# Patient Record
Sex: MALE | Race: WHITE | NOT HISPANIC OR LATINO | Employment: FULL TIME | URBAN - NONMETROPOLITAN AREA
[De-identification: names, ages, dates, MRNs, and addresses within clinical notes are randomized per-mention and may not be internally consistent; named-entity substitution may affect disease eponyms.]

---

## 2022-12-25 ENCOUNTER — APPOINTMENT (EMERGENCY)
Dept: CT IMAGING | Facility: HOSPITAL | Age: 42
End: 2022-12-25

## 2022-12-25 ENCOUNTER — APPOINTMENT (EMERGENCY)
Dept: NON INVASIVE DIAGNOSTICS | Facility: HOSPITAL | Age: 42
End: 2022-12-25

## 2022-12-25 ENCOUNTER — HOSPITAL ENCOUNTER (EMERGENCY)
Facility: HOSPITAL | Age: 42
Discharge: HOME | End: 2022-12-25
Attending: EMERGENCY MEDICINE

## 2022-12-25 VITALS
RESPIRATION RATE: 16 BRPM | SYSTOLIC BLOOD PRESSURE: 157 MMHG | OXYGEN SATURATION: 97 % | TEMPERATURE: 97.9 F | WEIGHT: 176.37 LBS | HEIGHT: 69 IN | BODY MASS INDEX: 26.12 KG/M2 | HEART RATE: 71 BPM | DIASTOLIC BLOOD PRESSURE: 83 MMHG

## 2022-12-25 DIAGNOSIS — R07.9 CHEST PAIN: ICD-10-CM

## 2022-12-25 DIAGNOSIS — M79.602 LEFT ARM PAIN: Primary | ICD-10-CM

## 2022-12-25 LAB
ALBUMIN SERPL BCP-MCNC: 3.4 G/DL (ref 3.5–5)
ALP SERPL-CCNC: 68 U/L (ref 46–116)
ALT SERPL W P-5'-P-CCNC: 61 U/L (ref 12–78)
ANION GAP SERPL CALCULATED.3IONS-SCNC: 8 MMOL/L (ref 4–13)
APTT PPP: 30 SECONDS (ref 23–37)
AST SERPL W P-5'-P-CCNC: 31 U/L (ref 5–45)
BASOPHILS # BLD AUTO: 0.09 THOUSANDS/ÂΜL (ref 0–0.1)
BASOPHILS NFR BLD AUTO: 1 % (ref 0–1)
BILIRUB SERPL-MCNC: 0.46 MG/DL (ref 0.2–1)
BUN SERPL-MCNC: 11 MG/DL (ref 5–25)
CALCIUM ALBUM COR SERPL-MCNC: 8.4 MG/DL (ref 8.3–10.1)
CALCIUM SERPL-MCNC: 7.9 MG/DL (ref 8.3–10.1)
CARDIAC TROPONIN I PNL SERPL HS: 6 NG/L
CHLORIDE SERPL-SCNC: 101 MMOL/L (ref 96–108)
CO2 SERPL-SCNC: 28 MMOL/L (ref 21–32)
CREAT SERPL-MCNC: 1.09 MG/DL (ref 0.6–1.3)
EOSINOPHIL # BLD AUTO: 0.28 THOUSAND/ÂΜL (ref 0–0.61)
EOSINOPHIL NFR BLD AUTO: 4 % (ref 0–6)
ERYTHROCYTE [DISTWIDTH] IN BLOOD BY AUTOMATED COUNT: 13.5 % (ref 11.6–15.1)
GFR SERPL CREATININE-BSD FRML MDRD: 83 ML/MIN/1.73SQ M
GLUCOSE SERPL-MCNC: 111 MG/DL (ref 65–140)
HCT VFR BLD AUTO: 43.9 % (ref 36.5–49.3)
HGB BLD-MCNC: 15.2 G/DL (ref 12–17)
IMM GRANULOCYTES # BLD AUTO: 0.04 THOUSAND/UL (ref 0–0.2)
IMM GRANULOCYTES NFR BLD AUTO: 1 % (ref 0–2)
INR PPP: 1.08 (ref 0.84–1.19)
LYMPHOCYTES # BLD AUTO: 2.4 THOUSANDS/ÂΜL (ref 0.6–4.47)
LYMPHOCYTES NFR BLD AUTO: 31 % (ref 14–44)
MAGNESIUM SERPL-MCNC: 2 MG/DL (ref 1.6–2.6)
MCH RBC QN AUTO: 31.9 PG (ref 26.8–34.3)
MCHC RBC AUTO-ENTMCNC: 34.6 G/DL (ref 31.4–37.4)
MCV RBC AUTO: 92 FL (ref 82–98)
MONOCYTES # BLD AUTO: 0.57 THOUSAND/ÂΜL (ref 0.17–1.22)
MONOCYTES NFR BLD AUTO: 7 % (ref 4–12)
NEUTROPHILS # BLD AUTO: 4.43 THOUSANDS/ÂΜL (ref 1.85–7.62)
NEUTS SEG NFR BLD AUTO: 56 % (ref 43–75)
NRBC BLD AUTO-RTO: 0 /100 WBCS
PLATELET # BLD AUTO: 274 THOUSANDS/UL (ref 149–390)
PMV BLD AUTO: 9.7 FL (ref 8.9–12.7)
POTASSIUM SERPL-SCNC: 4 MMOL/L (ref 3.5–5.3)
PROT SERPL-MCNC: 6.7 G/DL (ref 6.4–8.4)
PROTHROMBIN TIME: 14.1 SECONDS (ref 11.6–14.5)
RBC # BLD AUTO: 4.76 MILLION/UL (ref 3.88–5.62)
SODIUM SERPL-SCNC: 137 MMOL/L (ref 135–147)
WBC # BLD AUTO: 7.81 THOUSAND/UL (ref 4.31–10.16)

## 2022-12-25 RX ORDER — TRAMADOL HYDROCHLORIDE 50 MG/1
50 TABLET ORAL
COMMUNITY
Start: 2022-12-23

## 2022-12-25 RX ORDER — LEVOTHYROXINE SODIUM 0.05 MG/1
50 TABLET ORAL DAILY
COMMUNITY
Start: 2022-08-31

## 2022-12-25 RX ORDER — OMEPRAZOLE 20 MG/1
20 CAPSULE, DELAYED RELEASE ORAL
COMMUNITY
Start: 2022-09-08

## 2022-12-25 RX ORDER — FAMOTIDINE 20 MG/1
20 TABLET, FILM COATED ORAL DAILY
COMMUNITY
Start: 2022-08-30

## 2022-12-25 RX ORDER — SERTRALINE HYDROCHLORIDE 100 MG/1
100 TABLET, FILM COATED ORAL DAILY
COMMUNITY
Start: 2022-08-31

## 2022-12-25 RX ORDER — RISPERIDONE 0.5 MG/1
0.5 TABLET ORAL
COMMUNITY
Start: 2022-09-08

## 2022-12-25 RX ORDER — PANTOPRAZOLE SODIUM 20 MG/1
20 TABLET, DELAYED RELEASE ORAL DAILY
COMMUNITY
Start: 2022-08-31

## 2022-12-25 RX ORDER — ALBUTEROL SULFATE 90 UG/1
2 AEROSOL, METERED RESPIRATORY (INHALATION) EVERY 4 HOURS PRN
COMMUNITY
Start: 2022-08-29

## 2022-12-25 RX ADMIN — IOHEXOL 100 ML: 350 INJECTION, SOLUTION INTRAVENOUS at 14:42

## 2022-12-25 NOTE — ED NOTES
Patient transported to [facility]aiman Motcecilio, [address] [place] [address]  12/25/22 5535

## 2022-12-25 NOTE — ED PROVIDER NOTES
History  Chief Complaint   Patient presents with   • Arm Pain     Pt c/o left upper arm pain and warmth starting this morning-also mentioned while driving to ED experienced tightness in left shoulderblade radiating to chest at 1230  Pt recently dx blood clots in left leg 6 days ago starting eliquis 3days ago  The patient is a 40year old male who presnts to the Ed with the c/o chest pain SOB and left arm pain  The patient states that he was dx with a DVT in his right leg 5 days ago at another hospital  He was unable to get the rx of eliquis until 2 days ago  Last night into today he has had worsening left upper arm pain, now feels warm and swollen, no falls or injuries  He states that around 1 hour ago he started having left posterior shoulder pain with radiation to the left chest, he had SOB during that time  It lasted 15 minutes and subsided without intervention  Now only complaining of the left arm pain  Denies any recent illnesses, surgeries or travel  Has upcoming appointment with specialist Tuesday  He is concerned about an upper arm blood clot or a PE  Denies smoking  Chest Pain  Pain location:  L chest  Pain quality: sharp    Pain radiates to:  L arm  Pain radiates to the back: yes    Pain severity:  Moderate  Timing:  Constant  Progression:  Unchanged  Chronicity:  New  Context: at rest    Relieved by:  Nothing  Worsened by:  Nothing tried  Ineffective treatments:  None tried  Associated symptoms: shortness of breath    Associated symptoms: no abdominal pain, no anorexia, no back pain, no diaphoresis, no dysphagia, no fatigue, no heartburn, no near-syncope, no orthopnea, no palpitations and not vomiting    Shortness of breath:     Severity:  Unable to specify    Timing:  Constant    Progression:  Unchanged  Risk factors: prior DVT/PE        Prior to Admission Medications   Prescriptions Last Dose Informant Patient Reported? Taking?    albuterol (PROVENTIL HFA,VENTOLIN HFA) 90 mcg/act inhaler   Yes Yes   Sig: Inhale 2 puffs every 4 (four) hours as needed   apixaban (ELIQUIS) 5 mg   Yes Yes   Sig: Take 2 tablets twice a day for 7 days, then reduce dose to 1 tablet twice a day   famotidine (PEPCID) 20 mg tablet   Yes Yes   Sig: Take 20 mg by mouth daily   levothyroxine 50 mcg tablet   Yes Yes   Sig: Take 50 mcg by mouth daily   omeprazole (PriLOSEC) 20 mg delayed release capsule   Yes Yes   Sig: Take 20 mg by mouth   pantoprazole (PROTONIX) 20 mg tablet   Yes Yes   Sig: Take 20 mg by mouth daily   risperiDONE (RisperDAL) 0 5 mg tablet   Yes Yes   Sig: Take 0 5 mg by mouth   sertraline (ZOLOFT) 100 mg tablet   Yes Yes   Sig: Take 100 mg by mouth daily   traMADol (ULTRAM) 50 mg tablet   Yes Yes   Sig: Take 50 mg by mouth      Facility-Administered Medications: None       Past Medical History:   Diagnosis Date   • Asthma    • Celiac disease        History reviewed  No pertinent surgical history  History reviewed  No pertinent family history  I have reviewed and agree with the history as documented  E-Cigarette/Vaping   • E-Cigarette Use Never User      E-Cigarette/Vaping Substances     Social History     Tobacco Use   • Smoking status: Never   • Smokeless tobacco: Never   Vaping Use   • Vaping Use: Never used   Substance Use Topics   • Alcohol use: Never   • Drug use: Never       Review of Systems   Constitutional: Negative for chills, diaphoresis and fatigue  HENT: Negative for trouble swallowing  Eyes: Negative for pain and visual disturbance  Respiratory: Positive for shortness of breath  Negative for stridor  Cardiovascular: Positive for chest pain  Negative for palpitations, orthopnea and near-syncope  Gastrointestinal: Negative for abdominal pain, anorexia, heartburn and vomiting  Genitourinary: Negative for dysuria and hematuria  Musculoskeletal: Negative for arthralgias and back pain  Skin: Negative for color change  Neurological: Negative for seizures and speech difficulty     All other systems reviewed and are negative  Physical Exam  Physical Exam  Vitals and nursing note reviewed  Constitutional:       General: He is not in acute distress  Appearance: He is well-developed  HENT:      Head: Normocephalic and atraumatic  Mouth/Throat:      Mouth: Mucous membranes are moist    Eyes:      Extraocular Movements: Extraocular movements intact  Conjunctiva/sclera: Conjunctivae normal       Pupils: Pupils are equal, round, and reactive to light  Cardiovascular:      Rate and Rhythm: Normal rate and regular rhythm  Heart sounds: No murmur heard  Pulmonary:      Effort: Pulmonary effort is normal  No respiratory distress  Breath sounds: Normal breath sounds  Abdominal:      Palpations: Abdomen is soft  Tenderness: There is no abdominal tenderness  There is no right CVA tenderness or left CVA tenderness  Musculoskeletal:         General: No swelling  Cervical back: Normal range of motion and neck supple  Right lower leg: No edema  Left lower leg: No edema  Skin:     General: Skin is warm and dry  Capillary Refill: Capillary refill takes less than 2 seconds  Neurological:      General: No focal deficit present  Mental Status: He is alert and oriented to person, place, and time     Psychiatric:         Mood and Affect: Mood normal          Vital Signs  ED Triage Vitals [12/25/22 1311]   Temperature Pulse Respirations Blood Pressure SpO2   97 9 °F (36 6 °C) 94 18 130/94 98 %      Temp Source Heart Rate Source Patient Position - Orthostatic VS BP Location FiO2 (%)   Temporal Monitor Sitting Right arm --      Pain Score       7           Vitals:    12/25/22 1311 12/25/22 1315 12/25/22 1430   BP: 130/94 132/90 157/83   Pulse: 94 94 71   Patient Position - Orthostatic VS: Sitting           Visual Acuity      ED Medications  Medications   iohexol (OMNIPAQUE) 350 MG/ML injection (SINGLE-DOSE) 100 mL (100 mL Intravenous Given 12/25/22 1442)       Diagnostic Studies  Results Reviewed     Procedure Component Value Units Date/Time    Comprehensive metabolic panel [[id number]]  (Abnormal) Collected: 12/25/22 1321    Lab Status: Final result Specimen: Blood from Arm, Right Updated: 12/25/22 1421     Sodium 137 mmol/L      Potassium 4 0 mmol/L      Chloride 101 mmol/L      CO2 28 mmol/L      ANION GAP 8 mmol/L      BUN 11 mg/dL      Creatinine 1 09 mg/dL      Glucose 111 mg/dL      Calcium 7 9 mg/dL      Corrected Calcium 8 4 mg/dL      AST 31 U/L      ALT 61 U/L      Alkaline Phosphatase 68 U/L      Total Protein 6 7 g/dL      Albumin 3 4 g/dL      Total Bilirubin 0 46 mg/dL      eGFR 83 ml/min/1 73sq m     Narrative:      [place] guidelines for Chronic Kidney Disease (CKD):   •  Stage 1 with normal or high GFR (GFR > 90 mL/min/1 73 square meters)  •  Stage 2 Mild CKD (GFR = 60-89 mL/min/1 73 square meters)  •  Stage 3A Moderate CKD (GFR = 45-59 mL/min/1 73 square meters)  •  Stage 3B Moderate CKD (GFR = 30-44 mL/min/1 73 square meters)  •  Stage 4 Severe CKD (GFR = 15-29 mL/min/1 73 square meters)  •  Stage 5 End Stage CKD (GFR <15 mL/min/1 73 square meters)  Note: GFR calculation is accurate only with a steady state creatinine    Magnesium [[id number]]  (Normal) Collected: 12/25/22 1321    Lab Status: Final result Specimen: Blood from Arm, Right Updated: 12/25/22 1421     Magnesium 2 0 mg/dL     HS Troponin 0hr (reflex protocol) [[id number]]  (Normal) Collected: 12/25/22 1321    Lab Status: Final result Specimen: Blood from Arm, Right Updated: 12/25/22 1355     hs TnI 0hr 6 ng/L     Protime-INR [[id number]]  (Normal) Collected: 12/25/22 1321    Lab Status: Final result Specimen: Blood from Arm, Right Updated: 12/25/22 1343     Protime 14 1 seconds      INR 1 08    APTT [[id number]]  (Normal) Collected: 12/25/22 1321    Lab Status: Final result Specimen: Blood from Arm, Right Updated: 12/25/22 1343     PTT 30 seconds     CBC and differential [[id number]] Collected: 12/25/22 1321    Lab Status: Final result Specimen: Blood from Arm, Right Updated: 12/25/22 1330     WBC 7 81 Thousand/uL      RBC 4 76 Million/uL      Hemoglobin 15 2 g/dL      Hematocrit 43 9 %      MCV 92 fL      MCH 31 9 pg      MCHC 34 6 g/dL      RDW 13 5 %      MPV 9 7 fL      Platelets 276 Thousands/uL      nRBC 0 /100 WBCs      Neutrophils Relative 56 %      Immat GRANS % 1 %      Lymphocytes Relative 31 %      Monocytes Relative 7 %      Eosinophils Relative 4 %      Basophils Relative 1 %      Neutrophils Absolute 4 43 Thousands/µL      Immature Grans Absolute 0 04 Thousand/uL      Lymphocytes Absolute 2 40 Thousands/µL      Monocytes Absolute 0 57 Thousand/µL      Eosinophils Absolute 0 28 Thousand/µL      Basophils Absolute 0 09 Thousands/µL                  CTA ED chest PE study   Final Result by Danielle Hicks MD (12/25 9286)      No PE                    Workstation performed: [id number]         VAS upper limb venous duplex scan, unilateral/limited    (Results Pending)              Procedures  ECG 12 Lead Documentation Only    Date/Time: 12/25/2022 4:34 PM  Performed by: Matt Davis PA-C  Authorized by: Matt Davis PA-C     Indications / Diagnosis:  Cp  ECG reviewed by me, the ED Provider: yes    Patient location:  ED  Previous ECG:     Previous ECG:  Unavailable  Interpretation:     Interpretation: non-specific    Rate:     ECG rate:  78    ECG rate assessment: normal    Rhythm:     Rhythm: sinus rhythm               ED Course             HEART Risk Score    Flowsheet Row Most Recent Value   Heart Score Risk Calculator    History 0 Filed at: 12/25/2022 1537   ECG 0 Filed at: 12/25/2022 1537   Age 0 Filed at: 12/25/2022 1537   Risk Factors 1 Filed at: 12/25/2022 1537   Troponin 0 Filed at: 12/25/2022 1537   HEART Score 1 Filed at: 12/25/2022 1537                                      MDM  Number of Diagnoses or Management Options  Chest pain  Left arm pain  Diagnosis management comments: Work unremarkable cardiac enzymes negative, EKG without any acute ischemic findings  Patient was on room air, no respiratory distress  CT PE study was obtained, negative for PE or pneumonia  Ultrasound of the left upper extremity was negative for DVT  Patient was educated to continue Eliquis and follow-up  Patient expressed understanding was agreement treatment plan  Atypical chest pain upon today's diferential diagnosis includes but not limited to most likely MSK, anxiety, spasm, strain  Educated to f/u with pcp  Amount and/or Complexity of Data Reviewed  Clinical lab tests: ordered and reviewed  Tests in the radiology section of CPT®: ordered and reviewed  Decide to obtain previous medical records or to obtain history from someone other than the patient: yes  Review and summarize past medical records: yes  Independent visualization of images, tracings, or specimens: yes    Risk of Complications, Morbidity, and/or Mortality  Presenting problems: moderate  Diagnostic procedures: moderate  Management options: moderate        Disposition  Final diagnoses:   Left arm pain   Chest pain     Time reflects when diagnosis was documented in both MDM as applicable and the Disposition within this note     Time User Action Codes Description Comment    12/25/2022  3:37 PM Lore Quintana Add [U08 889] Left arm pain     12/25/2022  3:37 PM Lore Quintana Add [R07 9] Chest pain       ED Disposition     ED Disposition   Discharge    Condition   Stable    Date/Time   Sun Dec 25, 2022  3:37 PM    Comment   Geofm Raspberry discharge to home/self care                 Follow-up Information    None         Discharge Medication List as of 12/25/2022  3:37 PM      CONTINUE these medications which have NOT CHANGED    Details   albuterol (PROVENTIL HFA,VENTOLIN HFA) 90 mcg/act inhaler Inhale 2 puffs every 4 (four) hours as needed, Starting Mon 8/29/2022, Historical Med      apixaban (ELIQUIS) 5 mg Take 2 tablets twice a day for 7 days, then reduce dose to 1 tablet twice a day, Historical Med      famotidine (PEPCID) 20 mg tablet Take 20 mg by mouth daily, Starting Tue 8/30/2022, Historical Med      levothyroxine 50 mcg tablet Take 50 mcg by mouth daily, Starting Wed 8/31/2022, Historical Med      omeprazole (PriLOSEC) 20 mg delayed release capsule Take 20 mg by mouth, Starting Thu 9/8/2022, Historical Med      pantoprazole (PROTONIX) 20 mg tablet Take 20 mg by mouth daily, Starting Wed 8/31/2022, Historical Med      risperiDONE (RisperDAL) 0 5 mg tablet Take 0 5 mg by mouth, Starting Thu 9/8/2022, Historical Med      sertraline (ZOLOFT) 100 mg tablet Take 100 mg by mouth daily, Starting Wed 8/31/2022, Historical Med      traMADol (ULTRAM) 50 mg tablet Take 50 mg by mouth, Starting Fri 12/23/2022, Historical Med             No discharge procedures on file      PDMP Review     None          ED Provider  Electronically Signed by           Silvia Dennis PA-C  12/25/22 7363

## 2022-12-26 LAB
ATRIAL RATE: 78 BPM
P AXIS: 67 DEGREES
PR INTERVAL: 164 MS
QRS AXIS: 15 DEGREES
QRSD INTERVAL: 92 MS
QT INTERVAL: 368 MS
QTC INTERVAL: 419 MS
T WAVE AXIS: 37 DEGREES
VENTRICULAR RATE: 78 BPM

## 2023-11-12 ENCOUNTER — HOSPITAL ENCOUNTER (EMERGENCY)
Facility: HOSPITAL | Age: 43
Discharge: HOME | End: 2023-11-13
Attending: EMERGENCY MEDICINE

## 2023-11-12 ENCOUNTER — APPOINTMENT (EMERGENCY)
Dept: CT IMAGING | Facility: HOSPITAL | Age: 43
End: 2023-11-12

## 2023-11-12 DIAGNOSIS — R06.00 DYSPNEA: ICD-10-CM

## 2023-11-12 DIAGNOSIS — R07.9 CHEST PAIN: ICD-10-CM

## 2023-11-12 DIAGNOSIS — I26.99 BILATERAL PULMONARY EMBOLISM (HCC): Primary | ICD-10-CM

## 2023-11-12 LAB
ALBUMIN SERPL BCP-MCNC: 4.6 G/DL (ref 3.5–5)
ALP SERPL-CCNC: 63 U/L (ref 34–104)
ALT SERPL W P-5'-P-CCNC: 21 U/L (ref 7–52)
ANION GAP SERPL CALCULATED.3IONS-SCNC: 8 MMOL/L
APTT PPP: 38 SECONDS (ref 23–37)
AST SERPL W P-5'-P-CCNC: 20 U/L (ref 13–39)
BASOPHILS # BLD AUTO: 0.08 THOUSANDS/ÂΜL (ref 0–0.1)
BASOPHILS NFR BLD AUTO: 1 % (ref 0–1)
BILIRUB SERPL-MCNC: 0.31 MG/DL (ref 0.2–1)
BNP SERPL-MCNC: 14 PG/ML (ref 0–100)
BUN SERPL-MCNC: 13 MG/DL (ref 5–25)
CALCIUM SERPL-MCNC: 9.4 MG/DL (ref 8.4–10.2)
CARDIAC TROPONIN I PNL SERPL HS: 3 NG/L
CHLORIDE SERPL-SCNC: 102 MMOL/L (ref 96–108)
CO2 SERPL-SCNC: 29 MMOL/L (ref 21–32)
CREAT SERPL-MCNC: 1.19 MG/DL (ref 0.6–1.3)
D DIMER PPP FEU-MCNC: 0.38 UG/ML FEU
EOSINOPHIL # BLD AUTO: 0.19 THOUSAND/ÂΜL (ref 0–0.61)
EOSINOPHIL NFR BLD AUTO: 2 % (ref 0–6)
ERYTHROCYTE [DISTWIDTH] IN BLOOD BY AUTOMATED COUNT: 12.8 % (ref 11.6–15.1)
GFR SERPL CREATININE-BSD FRML MDRD: 74 ML/MIN/1.73SQ M
GLUCOSE SERPL-MCNC: 102 MG/DL (ref 65–140)
HCT VFR BLD AUTO: 46 % (ref 36.5–49.3)
HGB BLD-MCNC: 15.4 G/DL (ref 12–17)
IMM GRANULOCYTES # BLD AUTO: 0.06 THOUSAND/UL (ref 0–0.2)
IMM GRANULOCYTES NFR BLD AUTO: 1 % (ref 0–2)
INR PPP: 2.53 (ref 0.84–1.19)
LYMPHOCYTES # BLD AUTO: 2.52 THOUSANDS/ÂΜL (ref 0.6–4.47)
LYMPHOCYTES NFR BLD AUTO: 29 % (ref 14–44)
MCH RBC QN AUTO: 31 PG (ref 26.8–34.3)
MCHC RBC AUTO-ENTMCNC: 33.5 G/DL (ref 31.4–37.4)
MCV RBC AUTO: 93 FL (ref 82–98)
MONOCYTES # BLD AUTO: 0.77 THOUSAND/ÂΜL (ref 0.17–1.22)
MONOCYTES NFR BLD AUTO: 9 % (ref 4–12)
NEUTROPHILS # BLD AUTO: 5.22 THOUSANDS/ÂΜL (ref 1.85–7.62)
NEUTS SEG NFR BLD AUTO: 58 % (ref 43–75)
NRBC BLD AUTO-RTO: 0 /100 WBCS
PLATELET # BLD AUTO: 324 THOUSANDS/UL (ref 149–390)
PMV BLD AUTO: 10.7 FL (ref 8.9–12.7)
POTASSIUM SERPL-SCNC: 3.8 MMOL/L (ref 3.5–5.3)
PROT SERPL-MCNC: 6.9 G/DL (ref 6.4–8.4)
PROTHROMBIN TIME: 27.5 SECONDS (ref 11.6–14.5)
RBC # BLD AUTO: 4.96 MILLION/UL (ref 3.88–5.62)
SODIUM SERPL-SCNC: 139 MMOL/L (ref 135–147)
WBC # BLD AUTO: 8.84 THOUSAND/UL (ref 4.31–10.16)

## 2023-11-12 PROCEDURE — 99285 EMERGENCY DEPT VISIT HI MDM: CPT | Performed by: EMERGENCY MEDICINE

## 2023-11-12 PROCEDURE — 85025 COMPLETE CBC W/AUTO DIFF WBC: CPT | Performed by: EMERGENCY MEDICINE

## 2023-11-12 PROCEDURE — 83880 ASSAY OF NATRIURETIC PEPTIDE: CPT | Performed by: EMERGENCY MEDICINE

## 2023-11-12 PROCEDURE — 99285 EMERGENCY DEPT VISIT HI MDM: CPT

## 2023-11-12 PROCEDURE — 36415 COLL VENOUS BLD VENIPUNCTURE: CPT | Performed by: EMERGENCY MEDICINE

## 2023-11-12 PROCEDURE — 85610 PROTHROMBIN TIME: CPT | Performed by: EMERGENCY MEDICINE

## 2023-11-12 PROCEDURE — 85379 FIBRIN DEGRADATION QUANT: CPT | Performed by: EMERGENCY MEDICINE

## 2023-11-12 PROCEDURE — G1004 CDSM NDSC: HCPCS

## 2023-11-12 PROCEDURE — 93005 ELECTROCARDIOGRAM TRACING: CPT

## 2023-11-12 PROCEDURE — 71275 CT ANGIOGRAPHY CHEST: CPT

## 2023-11-12 PROCEDURE — 84484 ASSAY OF TROPONIN QUANT: CPT | Performed by: EMERGENCY MEDICINE

## 2023-11-12 PROCEDURE — 96360 HYDRATION IV INFUSION INIT: CPT

## 2023-11-12 PROCEDURE — 80053 COMPREHEN METABOLIC PANEL: CPT | Performed by: EMERGENCY MEDICINE

## 2023-11-12 PROCEDURE — 85730 THROMBOPLASTIN TIME PARTIAL: CPT | Performed by: EMERGENCY MEDICINE

## 2023-11-12 RX ADMIN — SODIUM CHLORIDE 1000 ML: 0.9 INJECTION, SOLUTION INTRAVENOUS at 22:41

## 2023-11-12 RX ADMIN — IOHEXOL 170 ML: 350 INJECTION, SOLUTION INTRAVENOUS at 23:54

## 2023-11-13 VITALS
SYSTOLIC BLOOD PRESSURE: 150 MMHG | RESPIRATION RATE: 28 BRPM | HEART RATE: 106 BPM | DIASTOLIC BLOOD PRESSURE: 97 MMHG | OXYGEN SATURATION: 98 % | BODY MASS INDEX: 27.31 KG/M2 | WEIGHT: 184.97 LBS | TEMPERATURE: 98.5 F

## 2023-11-13 LAB
APTT PPP: 40 SECONDS (ref 23–37)
ATRIAL RATE: 111 BPM
P AXIS: 67 DEGREES
PR INTERVAL: 158 MS
QRS AXIS: 13 DEGREES
QRSD INTERVAL: 96 MS
QT INTERVAL: 330 MS
QTC INTERVAL: 448 MS
T WAVE AXIS: 49 DEGREES
VENTRICULAR RATE: 111 BPM

## 2023-11-13 PROCEDURE — 85730 THROMBOPLASTIN TIME PARTIAL: CPT | Performed by: EMERGENCY MEDICINE

## 2023-11-13 PROCEDURE — 36415 COLL VENOUS BLD VENIPUNCTURE: CPT | Performed by: EMERGENCY MEDICINE

## 2023-11-13 RX ORDER — DIVALPROEX SODIUM 250 MG/1
500 TABLET, DELAYED RELEASE ORAL DAILY
COMMUNITY
Start: 2023-07-11

## 2023-11-13 RX ORDER — HEPARIN SODIUM 1000 [USP'U]/ML
6400 INJECTION, SOLUTION INTRAVENOUS; SUBCUTANEOUS EVERY 6 HOURS PRN
Status: DISCONTINUED | OUTPATIENT
Start: 2023-11-13 | End: 2023-11-13

## 2023-11-13 RX ORDER — HEPARIN SODIUM 1000 [USP'U]/ML
3200 INJECTION, SOLUTION INTRAVENOUS; SUBCUTANEOUS EVERY 6 HOURS PRN
Status: DISCONTINUED | OUTPATIENT
Start: 2023-11-13 | End: 2023-11-13

## 2023-11-13 RX ORDER — HEPARIN SODIUM 1000 [USP'U]/ML
6400 INJECTION, SOLUTION INTRAVENOUS; SUBCUTANEOUS ONCE
Status: DISCONTINUED | OUTPATIENT
Start: 2023-11-13 | End: 2023-11-13

## 2023-11-13 RX ORDER — WARFARIN SODIUM 5 MG/1
5 TABLET ORAL
COMMUNITY

## 2023-11-13 RX ORDER — HEPARIN SODIUM 10000 [USP'U]/100ML
3-30 INJECTION, SOLUTION INTRAVENOUS
Status: DISCONTINUED | OUTPATIENT
Start: 2023-11-13 | End: 2023-11-13

## 2023-11-13 NOTE — ED PROVIDER NOTES
History  Chief Complaint   Patient presents with    Shortness of Breath     Pt states SOB and chest pain x 1 day - hx of blood clots on coumadin. Patient is a 59-year-old male with a history of pulmonary embolism on Coumadin presents the emergency department complaining of left-sided chest pain described as sharp stabbing pain associated with shortness of breath started today after he was working excessively today. No specific trauma or injury occurred patient concerned about having blood clots again. History provided by:  Patient  Shortness of Breath  Severity:  Moderate  Onset quality:  Sudden  Duration:  1 day  Timing:  Constant  Progression:  Worsening  Chronicity:  Recurrent  Associated symptoms: chest pain    Associated symptoms: no abdominal pain, no cough, no ear pain, no fever, no headaches, no rash, no sore throat, no vomiting and no wheezing        Prior to Admission Medications   Prescriptions Last Dose Informant Patient Reported? Taking?    albuterol (PROVENTIL HFA,VENTOLIN HFA) 90 mcg/act inhaler Past Month  Yes Yes   Sig: Inhale 2 puffs every 4 (four) hours as needed   apixaban (ELIQUIS) 5 mg Not Taking  Yes No   Sig: Take 2 tablets twice a day for 7 days, then reduce dose to 1 tablet twice a day   Patient not taking: Reported on 11/13/2023   divalproex sodium (DEPAKOTE) 250 mg DR tablet   Yes Yes   Sig: Take 500 mg by mouth daily   famotidine (PEPCID) 20 mg tablet 11/13/2023  Yes Yes   Sig: Take 20 mg by mouth daily   levothyroxine 50 mcg tablet 11/13/2023  Yes Yes   Sig: Take 50 mcg by mouth daily   omeprazole (PriLOSEC) 20 mg delayed release capsule 11/13/2023  Yes Yes   Sig: Take 20 mg by mouth   pantoprazole (PROTONIX) 20 mg tablet Unknown  Yes No   Sig: Take 20 mg by mouth daily   risperiDONE (RisperDAL) 0.5 mg tablet 11/13/2023  Yes Yes   Sig: Take 0.5 mg by mouth   sertraline (ZOLOFT) 100 mg tablet 11/13/2023  Yes Yes   Sig: Take 100 mg by mouth daily   traMADol (ULTRAM) 50 mg tablet Not Taking  Yes No   Sig: Take 50 mg by mouth   Patient not taking: Reported on 11/13/2023   warfarin (COUMADIN) 5 mg tablet 11/13/2023  Yes Yes   Sig: Take 5 mg by mouth daily      Facility-Administered Medications: None       Past Medical History:   Diagnosis Date    Asthma     Celiac disease        History reviewed. No pertinent surgical history. History reviewed. No pertinent family history. I have reviewed and agree with the history as documented. E-Cigarette/Vaping    E-Cigarette Use Never User      E-Cigarette/Vaping Substances     Social History     Tobacco Use    Smoking status: Never    Smokeless tobacco: Never   Vaping Use    Vaping Use: Never used   Substance Use Topics    Alcohol use: Never    Drug use: Never       Review of Systems   Constitutional:  Negative for activity change, appetite change, chills, fatigue and fever. HENT:  Negative for congestion, ear pain, rhinorrhea and sore throat. Eyes:  Negative for discharge, redness and visual disturbance. Respiratory:  Positive for chest tightness and shortness of breath. Negative for cough and wheezing. Cardiovascular:  Positive for chest pain. Negative for palpitations. Gastrointestinal:  Negative for abdominal pain, constipation, diarrhea, nausea and vomiting. Endocrine: Negative for polydipsia and polyuria. Genitourinary:  Negative for difficulty urinating, dysuria, frequency, hematuria and urgency. Musculoskeletal:  Negative for arthralgias and myalgias. Skin:  Negative for color change, pallor and rash. Neurological:  Negative for dizziness, weakness, light-headedness, numbness and headaches. Hematological:  Negative for adenopathy. Does not bruise/bleed easily. All other systems reviewed and are negative. Physical Exam  Physical Exam  Vitals and nursing note reviewed. Constitutional:       Appearance: He is well-developed. HENT:      Head: Normocephalic and atraumatic.       Right Ear: External ear normal.      Left Ear: External ear normal.      Nose: Nose normal.   Eyes:      Conjunctiva/sclera: Conjunctivae normal.      Pupils: Pupils are equal, round, and reactive to light. Cardiovascular:      Rate and Rhythm: Regular rhythm. Tachycardia present. Heart sounds: Normal heart sounds. Pulmonary:      Effort: Pulmonary effort is normal. No respiratory distress. Breath sounds: Normal breath sounds. No wheezing or rales. Chest:      Chest wall: No tenderness. Abdominal:      General: Bowel sounds are normal. There is no distension. Palpations: Abdomen is soft. Tenderness: There is no abdominal tenderness. There is no guarding. Musculoskeletal:         General: Normal range of motion. Cervical back: Normal range of motion and neck supple. Skin:     General: Skin is warm and dry. Neurological:      Mental Status: He is alert and oriented to person, place, and time. Cranial Nerves: No cranial nerve deficit. Sensory: No sensory deficit.          Vital Signs  ED Triage Vitals [11/12/23 2229]   Temperature Pulse Respirations Blood Pressure SpO2   98.5 °F (36.9 °C) (!) 109 (!) 25 (!) 155/105 94 %      Temp Source Heart Rate Source Patient Position - Orthostatic VS BP Location FiO2 (%)   Oral Monitor Sitting Right arm --      Pain Score       5           Vitals:    11/12/23 2300 11/12/23 2315 11/13/23 0015 11/13/23 0045   BP: 135/97 133/90 132/93 (!) 144/103   Pulse: 103 98 95 89   Patient Position - Orthostatic VS: Lying Lying  Lying         Visual Acuity      ED Medications  Medications   sodium chloride 0.9 % bolus 1,000 mL (0 mL Intravenous Stopped 11/12/23 2356)   iohexol (OMNIPAQUE) 350 MG/ML injection (SINGLE-DOSE) 170 mL (170 mL Intravenous Given 11/12/23 2354)       Diagnostic Studies  Results Reviewed       Procedure Component Value Units Date/Time    APTT [[id number]] Collected: 11/13/23 0042    Lab Status: No result Specimen: Blood from Arm, Right     B-Type Natriuretic Peptide(BNP) [[id number]]  (Normal) Collected: 11/12/23 2239    Lab Status: Final result Specimen: Blood from Arm, Right Updated: 11/12/23 2350     BNP 14 pg/mL     HS Troponin 0hr (reflex protocol) [[id number]]  (Normal) Collected: 11/12/23 2239    Lab Status: Final result Specimen: Blood from Arm, Right Updated: 11/12/23 2327     hs TnI 0hr 3 ng/L     Comprehensive metabolic panel [[id number]] Collected: 11/12/23 2239    Lab Status: Final result Specimen: Blood from Arm, Right Updated: 11/12/23 2322     Sodium 139 mmol/L      Potassium 3.8 mmol/L      Chloride 102 mmol/L      CO2 29 mmol/L      ANION GAP 8 mmol/L      BUN 13 mg/dL      Creatinine 1.19 mg/dL      Glucose 102 mg/dL      Calcium 9.4 mg/dL      AST 20 U/L      ALT 21 U/L      Alkaline Phosphatase 63 U/L      Total Protein 6.9 g/dL      Albumin 4.6 g/dL      Total Bilirubin 0.31 mg/dL      eGFR 74 ml/min/1.73sq m     Narrative:      Specimen Lipemic.   [place] guidelines for Chronic Kidney Disease (CKD):     Stage 1 with normal or high GFR (GFR > 90 mL/min/1.73 square meters)    Stage 2 Mild CKD (GFR = 60-89 mL/min/1.73 square meters)    Stage 3A Moderate CKD (GFR = 45-59 mL/min/1.73 square meters)    Stage 3B Moderate CKD (GFR = 30-44 mL/min/1.73 square meters)    Stage 4 Severe CKD (GFR = 15-29 mL/min/1.73 square meters)    Stage 5 End Stage CKD (GFR <15 mL/min/1.73 square meters)  Note: GFR calculation is accurate only with a steady state creatinine    Protime-INR [[id number]]  (Abnormal) Collected: 11/12/23 2239    Lab Status: Final result Specimen: Blood from Arm, Right Updated: 11/12/23 2321     Protime 27.5 seconds      INR 2.53    APTT [[id number]]  (Abnormal) Collected: 11/12/23 2239    Lab Status: Final result Specimen: Blood from Arm, Right Updated: 11/12/23 2321     PTT 38 seconds     D-Dimer [[id number]]  (Normal) Collected: 11/12/23 3326    Lab Status: Final result Specimen: Blood from Arm, Right Updated: 11/12/23 2318     D-Dimer, Quant 0.38 ug/ml FEU     CBC and differential [[id number]] Collected: 11/12/23 2239    Lab Status: Final result Specimen: Blood from Arm, Right Updated: 11/12/23 2258     WBC 8.84 Thousand/uL      RBC 4.96 Million/uL      Hemoglobin 15.4 g/dL      Hematocrit 46.0 %      MCV 93 fL      MCH 31.0 pg      MCHC 33.5 g/dL      RDW 12.8 %      MPV 10.7 fL      Platelets 522 Thousands/uL      nRBC 0 /100 WBCs      Neutrophils Relative 58 %      Immat GRANS % 1 %      Lymphocytes Relative 29 %      Monocytes Relative 9 %      Eosinophils Relative 2 %      Basophils Relative 1 %      Neutrophils Absolute 5.22 Thousands/µL      Immature Grans Absolute 0.06 Thousand/uL      Lymphocytes Absolute 2.52 Thousands/µL      Monocytes Absolute 0.77 Thousand/µL      Eosinophils Absolute 0.19 Thousand/µL      Basophils Absolute 0.08 Thousands/µL                    CTA ED chest PE study   Final Result by Moses Vázquez DO (11/13 0055)   Addendum (preliminary) 1 of 1 by Moses Vázquez DO (11/13 0055)   ADDENDUM:      After discussion with ordering provider, was informed the patient was    scanned at another institution on 9/21/2023 with findings of bilateral    pulmonary emboli. Unfortunately, images are not available for comparison. Based on the report, the distribution    of clot is similar to today's CT, however, I cannot evaluate whether these    are chronic or recurrent findings. I personally discussed this with Lawson Chapman on 11/13/2023 12:55 AM.            Final      Bilateral lower lobe pulmonary emboli, as described      Measured RV/LV ratio is within normal limits at less than 0.9. I personally discussed this study with BENJY Thakkar on 11/13/2023 12:29 AM.               Workstation performed: [id number]                    Procedures  ECG 12 Lead Documentation Only    Date/Time: 11/12/2023 10:39 PM    Performed by: Lawson Chapman DO  Authorized by:  Lawson Chapman DO    ECG reviewed by me, the ED Provider: yes    Patient location:  ED  Previous ECG:     Comparison to cardiac monitor: Yes    Quality:     Tracing quality:  Limited by artifact  Rate:     ECG rate:  111    ECG rate assessment: tachycardic    Rhythm:     Rhythm: sinus tachycardia    QRS:     QRS axis:  Left    QRS intervals:  Normal  Conduction:     Conduction: normal    ST segments:     ST segments:  Normal  T waves:     T waves: normal             ED Course             HEART Risk Score      Flowsheet Row Most Recent Value   Heart Score Risk Calculator    History 0 Filed at: 11/13/2023 0013   ECG 0 Filed at: 11/13/2023 0013   Age 0 Filed at: 11/13/2023 0013   Risk Factors 0 Filed at: 11/13/2023 0013   Troponin 0 Filed at: 11/13/2023 0013   HEART Score 0 Filed at: 11/13/2023 0013                          SBIRT 20yo+      Flowsheet Row Most Recent Value   Initial Alcohol Screen: US AUDIT-C     1. How often do you have a drink containing alcohol? 0 Filed at: 11/12/2023 2231   2. How many drinks containing alcohol do you have on a typical day you are drinking? 0 Filed at: 11/12/2023 2231   3a. Male UNDER 65: How often do you have five or more drinks on one occasion? 0 Filed at: 11/12/2023 2231   Audit-C Score 0 Filed at: 11/12/2023 2231   MALOU: How many times in the past year have you. .. Used an illegal drug or used a prescription medication for non-medical reasons? Never Filed at: 11/12/2023 2231                      Medical Decision Making  Differential diagnosis included but not limited to acute coronary syndrome pulmonary embolism pneumonia pneumothorax.   Patient remained clinically and hemodynamically stable in the emergency department felt improved with rest fluids and monitoring in the ED. patient's primary concern was for recurrent pulmonary embolism work-up and evaluation in the ED shows no evidence of definite new acute pulmonary embolism radiology believes that PE is seen on repeat CT imaging are highly likely to be in the distribution of the prior PEs described and are small with no signs of right heart strain at this time. Advised patient of results and findings in the ED he felt well and wished to return home and continue on Coumadin currently as he is therapeutic advised that he continue Coumadin and advise rest and supportive care and prompt follow-up with primary physician for further evaluation and treatment and obtain test results. return precautions and anticipatory guidance discussed. Problems Addressed:  Bilateral pulmonary embolism ([address]): acute illness or injury  Chest pain: acute illness or injury  Dyspnea: acute illness or injury    Amount and/or Complexity of Data Reviewed  Labs: ordered. Decision-making details documented in ED Course. Radiology: ordered. Decision-making details documented in ED Course. ECG/medicine tests: ordered and independent interpretation performed. Decision-making details documented in ED Course. Risk  Prescription drug management.              Disposition  Final diagnoses:   Dyspnea   Chest pain   Bilateral pulmonary embolism ([address]) - chronic improved no right heart strain     Time reflects when diagnosis was documented in both MDM as applicable and the Disposition within this note       Time User Action Codes Description Comment    11/13/2023 12:12 AM Pennye Gagan Add [R06.00] Dyspnea     11/13/2023 12:12 AM Pennye Gagan Add [R07.9] Chest pain     11/13/2023 12:29 AM Pennye Gagan Add [I26.99] Bilateral pulmonary embolism ([address])     11/13/2023 12:29 AM Pennye Gagan Modify [R06.00] Dyspnea     11/13/2023 12:29 AM Pennye Gagan Modify [I26.99] Bilateral pulmonary embolism ([address])     11/13/2023  1:02 AM Pennye Gagan Modify [I26.99] Bilateral pulmonary embolism ([facility]) chronic improved no right heart strain          ED Disposition       ED Disposition   Discharge    Condition   Stable    Date/Time   Mon Nov 13, 2023 0102    Comment   Kati Llanos discharge to home/self care.                   Follow-up Information       Follow up With Specialties Details Why Contact Info      Schedule an appointment as soon as possible for a visit in 2 days  your pcp            Patient's Medications   Discharge Prescriptions    No medications on file       No discharge procedures on file.     PDMP Review       None            ED Provider  Electronically Signed by             Celsa Fields DO  11/13/23 0109

## 2024-06-11 ENCOUNTER — HOSPITAL ENCOUNTER (EMERGENCY)
Facility: HOSPITAL | Age: 44
Discharge: HOME | End: 2024-06-11
Attending: EMERGENCY MEDICINE

## 2024-06-11 VITALS
DIASTOLIC BLOOD PRESSURE: 90 MMHG | OXYGEN SATURATION: 97 % | WEIGHT: 181 LBS | BODY MASS INDEX: 30.16 KG/M2 | SYSTOLIC BLOOD PRESSURE: 164 MMHG | HEIGHT: 65 IN | HEART RATE: 97 BPM | RESPIRATION RATE: 16 BRPM | TEMPERATURE: 98.6 F

## 2024-06-11 DIAGNOSIS — M54.41 ACUTE RIGHT-SIDED LOW BACK PAIN WITH RIGHT-SIDED SCIATICA: Primary | ICD-10-CM

## 2024-06-11 PROCEDURE — 99283 EMERGENCY DEPT VISIT LOW MDM: CPT

## 2024-06-11 PROCEDURE — 96372 THER/PROPH/DIAG INJ SC/IM: CPT

## 2024-06-11 PROCEDURE — 99284 EMERGENCY DEPT VISIT MOD MDM: CPT | Performed by: EMERGENCY MEDICINE

## 2024-06-11 RX ORDER — DIAZEPAM 5 MG/ML
5 INJECTION, SOLUTION INTRAMUSCULAR; INTRAVENOUS ONCE
Status: COMPLETED | OUTPATIENT
Start: 2024-06-11 | End: 2024-06-11

## 2024-06-11 RX ORDER — KETOROLAC TROMETHAMINE 30 MG/ML
30 INJECTION, SOLUTION INTRAMUSCULAR; INTRAVENOUS ONCE
Status: COMPLETED | OUTPATIENT
Start: 2024-06-11 | End: 2024-06-11

## 2024-06-11 RX ORDER — CYCLOBENZAPRINE HCL 10 MG
10 TABLET ORAL 2 TIMES DAILY PRN
Qty: 20 TABLET | Refills: 0 | Status: SHIPPED | OUTPATIENT
Start: 2024-06-11

## 2024-06-11 RX ORDER — NAPROXEN 500 MG/1
500 TABLET ORAL 2 TIMES DAILY WITH MEALS
Qty: 30 TABLET | Refills: 0 | Status: SHIPPED | OUTPATIENT
Start: 2024-06-11

## 2024-06-11 RX ADMIN — KETOROLAC TROMETHAMINE 30 MG: 30 INJECTION, SOLUTION INTRAMUSCULAR at 02:40

## 2024-06-11 RX ADMIN — DIAZEPAM 5 MG: 5 INJECTION INTRAMUSCULAR; INTRAVENOUS at 02:40

## 2024-06-11 NOTE — ED PROVIDER NOTES
History  Chief Complaint   Patient presents with    Back Pain     Back pain for the last couple of weeks that has gotten increasingly worse. No apparent injury to the area.      Patient seen on 5/20 at [facility] emergency room for low back pain, prescribed Flexeril, had been improving but now once again with worsening low back pain.  Radiates down the right leg occasionally.  No bowel or bladder incontinence.  No urinary retention.  No fevers or chills.  No recent trauma to the area.  Patient has a follow-up appointment with his primary care physician in 3 days.  Has not seen pain management yet.  Has not been taking any medications at home to help with the pain since completing his Flexeril prescription.      History provided by:  Patient   used: No    Back Pain  Pain location: Right paralumbar.  Quality:  Aching  Radiates to:  R posterior upper leg  Pain severity:  Moderate  Pain is:  Worse during the night  Duration:  3 weeks  Timing:  Constant  Progression:  Unchanged  Chronicity:  New  Relieved by:  Nothing  Worsened by:  Nothing  Ineffective treatments:  None tried  Associated symptoms: numbness    Associated symptoms: no abdominal pain, no bladder incontinence, no bowel incontinence, no chest pain, no dysuria, no fever, no headaches, no perianal numbness and no weakness        Prior to Admission Medications   Prescriptions Last Dose Informant Patient Reported? Taking?   albuterol (PROVENTIL HFA,VENTOLIN HFA) 90 mcg/act inhaler   Yes No   Sig: Inhale 2 puffs every 4 (four) hours as needed   apixaban (ELIQUIS) 5 mg   Yes No   Sig: Take 2 tablets twice a day for 7 days, then reduce dose to 1 tablet twice a day   Patient not taking: Reported on 11/13/2023   divalproex sodium (DEPAKOTE) 250 mg DR tablet   Yes No   Sig: Take 500 mg by mouth daily   famotidine (PEPCID) 20 mg tablet   Yes No   Sig: Take 20 mg by mouth daily   levothyroxine 50 mcg tablet   Yes No   Sig: Take 50 mcg by mouth  daily   omeprazole (PriLOSEC) 20 mg delayed release capsule   Yes No   Sig: Take 20 mg by mouth   pantoprazole (PROTONIX) 20 mg tablet   Yes No   Sig: Take 20 mg by mouth daily   risperiDONE (RisperDAL) 0.5 mg tablet   Yes No   Sig: Take 0.5 mg by mouth   sertraline (ZOLOFT) 100 mg tablet   Yes No   Sig: Take 100 mg by mouth daily   traMADol (ULTRAM) 50 mg tablet   Yes No   Sig: Take 50 mg by mouth   Patient not taking: Reported on 11/13/2023   warfarin (COUMADIN) 5 mg tablet   Yes No   Sig: Take 5 mg by mouth daily      Facility-Administered Medications: None       Past Medical History:   Diagnosis Date    Asthma     Celiac disease        History reviewed. No pertinent surgical history.    History reviewed. No pertinent family history.  I have reviewed and agree with the history as documented.    E-Cigarette/Vaping    E-Cigarette Use Never User      E-Cigarette/Vaping Substances     Social History     Tobacco Use    Smoking status: Never    Smokeless tobacco: Never   Vaping Use    Vaping status: Never Used   Substance Use Topics    Alcohol use: Never    Drug use: Never       Review of Systems   Constitutional:  Negative for chills and fever.   HENT:  Negative for ear pain, hearing loss, sore throat, trouble swallowing and voice change.    Eyes:  Negative for pain and discharge.   Respiratory:  Negative for cough, shortness of breath and wheezing.    Cardiovascular:  Negative for chest pain and palpitations.   Gastrointestinal:  Negative for abdominal pain, blood in stool, bowel incontinence, constipation, diarrhea, nausea and vomiting.   Genitourinary:  Negative for bladder incontinence, dysuria, flank pain, frequency and hematuria.   Musculoskeletal:  Positive for back pain. Negative for joint swelling, neck pain and neck stiffness.   Skin:  Negative for rash and wound.   Neurological:  Positive for numbness. Negative for dizziness, seizures, syncope, facial asymmetry, weakness and headaches.    Psychiatric/Behavioral:  Negative for hallucinations, self-injury and suicidal ideas.    All other systems reviewed and are negative.      Physical Exam  Physical Exam  Vitals and nursing note reviewed.   Constitutional:       General: He is not in acute distress.     Appearance: Normal appearance. He is well-developed. He is not ill-appearing or diaphoretic.   HENT:      Head: Normocephalic and atraumatic.      Right Ear: External ear normal.      Left Ear: External ear normal.   Eyes:      General: No scleral icterus.        Right eye: No discharge.         Left eye: No discharge.      Extraocular Movements: Extraocular movements intact.      Conjunctiva/sclera: Conjunctivae normal.   Pulmonary:      Effort: Pulmonary effort is normal. No respiratory distress.   Musculoskeletal:         General: No swelling or deformity. Normal range of motion.      Cervical back: Normal range of motion and neck supple.      Comments: No deformity of the thoracic or lumbar spine.  No midline tenderness.  Right paralumbar tenderness and spasm.  Bilateral lower extremity strength and reflexes are normal including great toe dorsiflexion.  Patient has normal rectal tone, normal dagoberto anal sensation.   Skin:     General: Skin is dry.      Coloration: Skin is not jaundiced or pale.      Findings: No rash.   Neurological:      General: No focal deficit present.      Mental Status: He is alert and oriented to person, place, and time.      Cranial Nerves: No cranial nerve deficit.      Motor: No weakness.      Coordination: Coordination normal.      Gait: Gait normal.   Psychiatric:         Mood and Affect: Mood normal.         Behavior: Behavior normal.         Thought Content: Thought content normal.         Judgment: Judgment normal.         Vital Signs  ED Triage Vitals [06/11/24 0213]   Temperature Pulse Respirations Blood Pressure SpO2   98.6 °F (37 °C) 97 16 164/90 97 %      Temp Source Heart Rate Source Patient Position -  Orthostatic VS BP Location FiO2 (%)   Temporal -- Lying Right arm --      Pain Score       10 - Worst Possible Pain           Vitals:    06/11/24 0213   BP: 164/90   Pulse: 97   Patient Position - Orthostatic VS: Lying         Visual Acuity      ED Medications  Medications   ketorolac (TORADOL) injection 30 mg (30 mg Intramuscular Given 6/11/24 0240)   diazepam (VALIUM) injection 5 mg (5 mg Intramuscular Given 6/11/24 0240)       Diagnostic Studies  Results Reviewed       None                   No orders to display              Procedures  Procedures         ED Course                               SBIRT 20yo+      Flowsheet Row Most Recent Value   Initial Alcohol Screen: US AUDIT-C     1. How often do you have a drink containing alcohol? 0 Filed at: 06/11/2024 0214   2. How many drinks containing alcohol do you have on a typical day you are drinking?  0 Filed at: 06/11/2024 0214   3a. Male UNDER 65: How often do you have five or more drinks on one occasion? 0 Filed at: 06/11/2024 0214   3b. FEMALE Any Age, or MALE 65+: How often do you have 4 or more drinks on one occassion? 0 Filed at: 06/11/2024 0214   Audit-C Score 0 Filed at: 06/11/2024 0214   MALOU: How many times in the past year have you...    Used an illegal drug or used a prescription medication for non-medical reasons? Never Filed at: 06/11/2024 0214                      Medical Decision Making  Based on the history and medical screening exam performed the diagnostic considerations include but are not limited to sciatica, muscular back pain, disc problem.    Based on the work-up performed in the emergency room which includes physical examination, and which may include laboratory studies and imaging as warranted including advanced imaging such as CT scan or ultrasound, the diagnostic considerations are narrowed to exclude limb or life-threatening process.    The patient is stable for discharge.  Explained to patient that this would likely be a chronic issue.   He has no signs or symptoms of spinal cord involvement but should follow-up with pain management.  Outpatient follow-up information for pain management provided to patient.  Patient provided with prescriptions for Flexeril and naproxen.    Risk  Prescription drug management.             Disposition  Final diagnoses:   Acute right-sided low back pain with right-sided sciatica     Time reflects when diagnosis was documented in both MDM as applicable and the Disposition within this note       Time User Action Codes Description Comment    6/11/2024  2:33 AM Alec Way [M54.41] Acute right-sided low back pain with right-sided sciatica           ED Disposition       ED Disposition   Discharge    Condition   Stable    Date/Time   Tue Jun 11, 2024 0250    Comment   Cj Fisheraver discharge to home/self care.                   Follow-up Information       Follow up With Specialties Details Why Contact Info    Bubba Funez MD Pain Medicine, Interventional Radiology   [address] [id number]  [phone]              Patient's Medications   Discharge Prescriptions    CYCLOBENZAPRINE (FLEXERIL) 10 MG TABLET    Take 1 tablet (10 mg total) by mouth 2 (two) times a day as needed for muscle spasms       Start Date: 6/11/2024 End Date: --       Order Dose: 10 mg       Quantity: 20 tablet    Refills: 0    NAPROXEN (NAPROSYN) 500 MG TABLET    Take 1 tablet (500 mg total) by mouth 2 (two) times a day with meals       Start Date: 6/11/2024 End Date: --       Order Dose: 500 mg       Quantity: 30 tablet    Refills: 0           PDMP Review       None            ED Provider  Electronically Signed by             Alec Way MD  06/11/24 0250       Alec Way MD  06/11/24 0250

## 2024-06-11 NOTE — Clinical Note
Cj Camp was seen and treated in our emergency department on 6/11/2024.                Diagnosis:     Cj  may return to work on return date.    He may return on this date: 06/12/2024    Please excuse any time missed on 6/11/2024    Please call the ED with any questions you may have  [phone]       If you have any questions or concerns, please don't hesitate to call.      Alec Way MD    ______________________________           _______________          _______________  Hospital Representative                              Date                                Time